# Patient Record
Sex: MALE | Employment: FULL TIME | URBAN - METROPOLITAN AREA
[De-identification: names, ages, dates, MRNs, and addresses within clinical notes are randomized per-mention and may not be internally consistent; named-entity substitution may affect disease eponyms.]

---

## 2020-12-27 ENCOUNTER — HOSPITAL ENCOUNTER (EMERGENCY)
Facility: CLINIC | Age: 40
Discharge: HOME | End: 2020-12-27
Attending: PHYSICIAN ASSISTANT | Admitting: PHYSICIAN ASSISTANT
Payer: COMMERCIAL

## 2020-12-27 ENCOUNTER — APPOINTMENT (OUTPATIENT)
Dept: GENERAL RADIOLOGY | Facility: CLINIC | Age: 40
End: 2020-12-27
Attending: PHYSICIAN ASSISTANT
Payer: COMMERCIAL

## 2020-12-27 VITALS
HEART RATE: 81 BPM | WEIGHT: 140 LBS | RESPIRATION RATE: 9 BRPM | OXYGEN SATURATION: 97 % | BODY MASS INDEX: 22.5 KG/M2 | TEMPERATURE: 98.8 F | HEIGHT: 66 IN | SYSTOLIC BLOOD PRESSURE: 139 MMHG | DIASTOLIC BLOOD PRESSURE: 99 MMHG

## 2020-12-27 DIAGNOSIS — R07.9 ACUTE CHEST PAIN: ICD-10-CM

## 2020-12-27 LAB
ALBUMIN SERPL-MCNC: 4.6 G/DL (ref 3.4–5)
ALP SERPL-CCNC: 76 U/L (ref 40–150)
ALT SERPL W P-5'-P-CCNC: 87 U/L (ref 0–70)
ANION GAP SERPL CALCULATED.3IONS-SCNC: 3 MMOL/L (ref 3–14)
AST SERPL W P-5'-P-CCNC: 32 U/L (ref 0–45)
BASOPHILS # BLD AUTO: 0 10E9/L (ref 0–0.2)
BASOPHILS NFR BLD AUTO: 0.3 %
BILIRUB SERPL-MCNC: 0.6 MG/DL (ref 0.2–1.3)
BUN SERPL-MCNC: 10 MG/DL (ref 7–30)
CALCIUM SERPL-MCNC: 10.2 MG/DL (ref 8.5–10.1)
CHLORIDE SERPL-SCNC: 108 MMOL/L (ref 94–109)
CO2 SERPL-SCNC: 31 MMOL/L (ref 20–32)
CREAT SERPL-MCNC: 1.05 MG/DL (ref 0.66–1.25)
D DIMER PPP FEU-MCNC: <0.3 UG/ML FEU (ref 0–0.5)
DIFFERENTIAL METHOD BLD: NORMAL
EOSINOPHIL # BLD AUTO: 0 10E9/L (ref 0–0.7)
EOSINOPHIL NFR BLD AUTO: 0.5 %
ERYTHROCYTE [DISTWIDTH] IN BLOOD BY AUTOMATED COUNT: 12.3 % (ref 10–15)
GFR SERPL CREATININE-BSD FRML MDRD: 89 ML/MIN/{1.73_M2}
GLUCOSE SERPL-MCNC: 100 MG/DL (ref 70–99)
HCT VFR BLD AUTO: 47.1 % (ref 40–53)
HGB BLD-MCNC: 15.8 G/DL (ref 13.3–17.7)
IMM GRANULOCYTES # BLD: 0 10E9/L (ref 0–0.4)
IMM GRANULOCYTES NFR BLD: 0.3 %
INTERPRETATION ECG - MUSE: NORMAL
LIPASE SERPL-CCNC: 134 U/L (ref 73–393)
LYMPHOCYTES # BLD AUTO: 0.9 10E9/L (ref 0.8–5.3)
LYMPHOCYTES NFR BLD AUTO: 11.3 %
MCH RBC QN AUTO: 30.8 PG (ref 26.5–33)
MCHC RBC AUTO-ENTMCNC: 33.5 G/DL (ref 31.5–36.5)
MCV RBC AUTO: 92 FL (ref 78–100)
MONOCYTES # BLD AUTO: 0.6 10E9/L (ref 0–1.3)
MONOCYTES NFR BLD AUTO: 7.6 %
NEUTROPHILS # BLD AUTO: 6.4 10E9/L (ref 1.6–8.3)
NEUTROPHILS NFR BLD AUTO: 80 %
NRBC # BLD AUTO: 0 10*3/UL
NRBC BLD AUTO-RTO: 0 /100
PLATELET # BLD AUTO: 242 10E9/L (ref 150–450)
POTASSIUM SERPL-SCNC: 3.6 MMOL/L (ref 3.4–5.3)
PROT SERPL-MCNC: 8.1 G/DL (ref 6.8–8.8)
RBC # BLD AUTO: 5.13 10E12/L (ref 4.4–5.9)
SODIUM SERPL-SCNC: 142 MMOL/L (ref 133–144)
TROPONIN I SERPL-MCNC: <0.015 UG/L (ref 0–0.04)
WBC # BLD AUTO: 7.9 10E9/L (ref 4–11)

## 2020-12-27 PROCEDURE — 250N000009 HC RX 250: Performed by: PHYSICIAN ASSISTANT

## 2020-12-27 PROCEDURE — 80053 COMPREHEN METABOLIC PANEL: CPT | Performed by: PHYSICIAN ASSISTANT

## 2020-12-27 PROCEDURE — 85379 FIBRIN DEGRADATION QUANT: CPT | Performed by: PHYSICIAN ASSISTANT

## 2020-12-27 PROCEDURE — 99285 EMERGENCY DEPT VISIT HI MDM: CPT | Mod: 25

## 2020-12-27 PROCEDURE — 84484 ASSAY OF TROPONIN QUANT: CPT | Performed by: PHYSICIAN ASSISTANT

## 2020-12-27 PROCEDURE — 250N000013 HC RX MED GY IP 250 OP 250 PS 637: Performed by: PHYSICIAN ASSISTANT

## 2020-12-27 PROCEDURE — 93005 ELECTROCARDIOGRAM TRACING: CPT

## 2020-12-27 PROCEDURE — 85025 COMPLETE CBC W/AUTO DIFF WBC: CPT | Performed by: PHYSICIAN ASSISTANT

## 2020-12-27 PROCEDURE — 71046 X-RAY EXAM CHEST 2 VIEWS: CPT

## 2020-12-27 PROCEDURE — 83690 ASSAY OF LIPASE: CPT | Performed by: PHYSICIAN ASSISTANT

## 2020-12-27 RX ORDER — ASPIRIN 325 MG
325 TABLET ORAL ONCE
Status: COMPLETED | OUTPATIENT
Start: 2020-12-27 | End: 2020-12-27

## 2020-12-27 RX ADMIN — LIDOCAINE HYDROCHLORIDE: 20 SOLUTION ORAL; TOPICAL at 16:34

## 2020-12-27 RX ADMIN — ASPIRIN 325 MG ORAL TABLET 325 MG: 325 PILL ORAL at 15:55

## 2020-12-27 ASSESSMENT — MIFFLIN-ST. JEOR: SCORE: 1492.79

## 2020-12-27 NOTE — ED AVS SNAPSHOT
[facility] Emergency Dept  [address] [facility] [address]  Phone: [phone]  Fax: [phone]                                    Nirmal Carvajal   MRN: [id number]    Department: [facility] Emergency Dept   Date of Visit: 12/27/2020           After Visit Summary Signature Page    I have received my discharge instructions, and my questions have been answered. I have discussed any challenges I see with this plan with the nurse or doctor.    ..........................................................................................................................................  Patient/Patient Representative Signature      ..........................................................................................................................................  Patient Representative Print Name and Relationship to Patient    ..................................................               ................................................  Date                                   Time    ..........................................................................................................................................  Reviewed by Signature/Title    ...................................................              ..............................................  Date                                               Time          22EPIC Rev 08/18

## 2020-12-27 NOTE — ED TRIAGE NOTES
Presents with left sided chest pain on and off since yesterday 5pm. He was out for a 2 mile walk when pain developed. Described as shooting twinges dull ache.  History of similar symptoms 2019.

## 2020-12-27 NOTE — ED PROVIDER NOTES
History     Chief Complaint:  Chest Pain      HPI   Nirmal Carvajal is a 40 year old male who presents with chest pain. The patient states that he was on a walk around 4 PM yesterday when he began to develop sharp left-sided chest pain.  He had 2 episodes during his 2 mile walk was able to make it back home. He states these episodes lasted a few seconds. He rested the remainder of the night. He had 2 more episodes at midnight and then began to develop left arm achiness. He googled his symptoms, which prompted his evaluation here. He states he had a similar episode in April 2019 when he was living in [place] but never had appropriate follow-up. He states he is an asthmatic but has no other medical problems  Occasionally, his heart will flutter but he did denies any shortness of breath. He also denies cough, fever or chills. He denies any leg swelling.    Cardiac/PE/DVT Risk Factors:  History of hypertension - n  History of hyperlipidemia - n  History of diabetes - n  History of smoking - n  Personal history of PE/DVT - n  Family history of PE/DVT - n  Family history of heart complications - p  Recent travel - n  Recent surgery - n  Other immobilizations - n  Cancer - n    Allergies:  No known drug allergies    Medications:  Proair inhaler  Prilosec  Albuterol nebulizer  Advair inhaler    Past Medical History:    Asthma  Allergic rhinitis  Migraines    Past Surgical History:    [place] Teeth Extraction    Family History:    Type II diabetes mellitus (Father)  Alcohol abuse (Mother)  Drug abuse (Mother)  Migraines (Sister)    Social History:  Smoking status: No  Alcohol use: Occasionally  Just moved from [place]    Review of Systems  Ten review of systems negative, apart from what is noted above in HPI.     Physical Exam     Patient Vitals for the past 24 hrs:   BP Temp Temp src Pulse Resp SpO2 Height Weight   12/27/20 1700 (!) 139/99 -- -- 81 9 97 % -- --   12/27/20 1600 (!) 140/86 -- -- 85 18 96 % -- --  "  12/27/20 1536 (!) 143/100 98.8  F (37.1  C) Oral 106 18 97 % 1.676 m (5' 6\") 63.5 kg (140 lb)     Physical Exam  General: Alert and interactive. Appears anxious.   Eyes: The pupils are equal and round. EOMs intact. No scleral icterus.  ENT: No abnormalities to the external nose or ears. Mucous membranes moist. Posterior oropharynx is non-erythematous.      Neck: Trachea is in the midline. No nuchal rigidity.     CV: Tachycardic. S1 and S2 normal without murmur, click, gallop or rub.   Resp: Breath sounds are clear bilaterally, without rhonchi, wheezes, rales. Non-labored, no retractions or accessory muscle use.     GI: Abdomen is soft without distension. No tenderness to palpation. No peritoneal signs.    MS: Moving all extremities well. Good muscle tone.   Skin: Warm and dry. No rash or lesions noted.  Neuro: Alert and oriented x 3. No focal neurologic deficits. Good strength and sensation in upper and lower extremities.    Psych: Awake. Alert.  Normal affect. Appropriate interactions.  Lymph: No anterior or posterior cervical lymphadenopathy noted.    Emergency Department Course   ECG:  @ 1539  Vent. Rate 100 bpm. SC interval 140 ms. QRS duration 84 ms. QT/QTc 344/443 ms. P-R-T axis 75 88 53.   Normal sinus rhythm. Biatrial enlargement. Abnormal ECG.    Read @ 1542 by Dr. Eli.     Imaging:  Radiographic findings were communicated with the patient who voiced understanding of the findings.    Chest XR, PA, & LAT  PA and lateral views of the chest were obtained.   Cardiomediastinal silhouette is within normal limits. No suspicious   focal pulmonary opacities. No significant pleural effusion or   pneumothorax.   As read by Radiology.      Laboratory:  CBC: WNL (WBC 7.9, HGB 15.8, )  CMP: Glucose 100 (H), Calcium 10.2 (H), ALT 87 (H) o/w WNL (Creatinine 1.05)  D dimer: <0.3  Troponin I (Collected 1547): <0.015  Lipase: 134    Emergency Department Course:  Reviewed:  1530 I reviewed nursing notes, vitals, " past medical history and care everywhere    Assessments:   I performed an exam of the patient and obtained history, as documented above.  1624 I rechecked patient.   1703 Findings and plan explained to the Patient. Patient discharged home with instructions regarding supportive care, medications, and reasons to return. The importance of close follow-up was reviewed.     Interventions:  1555: Aspirin 325 mg PO  1634: GI Cocktail - Maalox 15 mL, Viscous Lidocaine 15 mL, 30 mL suspension PO     Disposition:  The patient was discharged to home.       Impression & Plan      Medical Decision Making:  Nirmal Carvajal is a 40 year old male who presents with chest pain. Has had sharp pains intermittently over past 24 hours and constant left arm aches since early this morning. The workup in the Emergency Department is negative and reassuring. The differential is broad, including ACS, PE, cardiac tamponade, aortic dissection, pneumonia, pneumothorax, pericarditis, esophageal rupture, and others.     EKG shows NSR without signs of ischemia and infarction and troponin negative, and thus I doubt ACS. One troponin sufficient for rule out given that pain has been ongoing and constant for greater than 6 hours. Dimer is negative, and I doubt PE. Additionally, the patient has no signs of tachypnea or hypoxia. No signs of leukocytosis, anemia, renal dysfunction, electrolyte abnormalities and chest x-ray shows no signs of pneumonia, pneumothorax, or pleural effusions.    Symptoms are atypical of ACS.  I suspect this is either musculoskeletal in etiology or related to anxiety. His symptoms did not improve with GI cocktail, although he is sometimes suffers from a burning sensation in his upper belly, I do not think this is causing his chest pain. Lipase is normal, making pancreatitis less likely. He has no tenderness to palpation in his abdomen, making intra-abdominal pathology less likely.    The patient has a HEART score of 0, and I believe  she is stable for outpatient follow up. Discussed stress echocardiography, and patient would like to follow up with PCP for referral for this, which I find reasonable. Return here for worsening chest pain, shortness of breath, vomiting, fevers, or other concerns.     HEART SCORE:    History:   Highly suspicious (2)          Moderately suspicious (1)         Slightly suspicious (0)     0    ECG:   Significant ST depression(1mm continguous)      Non-specific repolarization abnormality       Normal       0    Age:   > 65            45-65            < 45       0    Risk Factors:  3 or more           1-2            No risk factors      0    Troponin:   > 3x normal limit     1-3x normal limit     < normal limit      0    Total:           0      **Risk factors: DM, current or recent smoker (< 90 days), HTN, hyperlipidemia, Fam hx of CAD, BMI > 30, history of atheroslcerosis    A HEART score of < 3 places their risk of major adverse cardiac event at about 1.7% at 6 weeks (<1% at 30 days).  If repeated troponin at 3 hours likely reduces risk to less than 1%.        Diagnosis:    ICD-10-CM    1. Acute chest pain  R07.9        Disposition:  discharged to home    Luisa Carrero PA-C  12/27/2020   [facility] EMERGENCY DEPT    Scribe Disclosure:  I, Adrianna Caceres, am serving as a scribe at 4:07 PM on 12/27/2020 to document services personally performed by Luisa Carrero PA-C based on my observations and the provider's statements to me.         Luisa Carrero PA-C  12/27/20 1818